# Patient Record
Sex: MALE | Race: WHITE
[De-identification: names, ages, dates, MRNs, and addresses within clinical notes are randomized per-mention and may not be internally consistent; named-entity substitution may affect disease eponyms.]

---

## 2021-01-25 ENCOUNTER — HOSPITAL ENCOUNTER (EMERGENCY)
Dept: HOSPITAL 54 - ER | Age: 51
Discharge: HOME | End: 2021-01-25
Payer: SELF-PAY

## 2021-01-25 VITALS — SYSTOLIC BLOOD PRESSURE: 147 MMHG | DIASTOLIC BLOOD PRESSURE: 90 MMHG

## 2021-01-25 VITALS — HEIGHT: 69 IN | BODY MASS INDEX: 34.07 KG/M2 | WEIGHT: 230 LBS

## 2021-01-25 DIAGNOSIS — U07.1: Primary | ICD-10-CM

## 2021-01-25 PROCEDURE — 71045 X-RAY EXAM CHEST 1 VIEW: CPT

## 2021-01-25 PROCEDURE — 99284 EMERGENCY DEPT VISIT MOD MDM: CPT

## 2021-01-25 PROCEDURE — U0003 INFECTIOUS AGENT DETECTION BY NUCLEIC ACID (DNA OR RNA); SEVERE ACUTE RESPIRATORY SYNDROME CORONAVIRUS 2 (SARS-COV-2) (CORONAVIRUS DISEASE [COVID-19]), AMPLIFIED PROBE TECHNIQUE, MAKING USE OF HIGH THROUGHPUT TECHNOLOGIES AS DESCRIBED BY CMS-2020-01-R: HCPCS

## 2021-01-25 PROCEDURE — C9803 HOPD COVID-19 SPEC COLLECT: HCPCS

## 2021-01-25 PROCEDURE — 87426 SARSCOV CORONAVIRUS AG IA: CPT

## 2021-01-25 NOTE — NUR
pt bibself c/o fever, chills, non productive cough, and night sweats x 3days. 
Pt [id number] breathing evenly and unlabored.  Pt skin is warm, dry, and intact. Pt 
attached to monitor and pox. Call light within reach

## 2022-05-08 ENCOUNTER — HOSPITAL ENCOUNTER (EMERGENCY)
Dept: HOSPITAL 54 - ER | Age: 52
Discharge: HOME | End: 2022-05-08
Payer: MEDICAID

## 2022-05-08 VITALS — DIASTOLIC BLOOD PRESSURE: 87 MMHG | SYSTOLIC BLOOD PRESSURE: 151 MMHG

## 2022-05-08 VITALS — BODY MASS INDEX: 34.8 KG/M2 | HEIGHT: 69 IN | WEIGHT: 235 LBS

## 2022-05-08 DIAGNOSIS — Z53.29: ICD-10-CM

## 2022-05-08 DIAGNOSIS — I31.9: Primary | ICD-10-CM

## 2022-05-08 DIAGNOSIS — K76.0: ICD-10-CM

## 2022-05-08 DIAGNOSIS — R19.7: ICD-10-CM

## 2022-05-08 DIAGNOSIS — Z20.822: ICD-10-CM

## 2022-05-08 DIAGNOSIS — R10.9: ICD-10-CM

## 2022-05-08 DIAGNOSIS — I47.1: ICD-10-CM

## 2022-05-08 DIAGNOSIS — R42: ICD-10-CM

## 2022-05-08 LAB
ALBUMIN SERPL BCP-MCNC: 3.7 G/DL (ref 3.4–5)
ALP SERPL-CCNC: 112 U/L (ref 46–116)
ALT SERPL W P-5'-P-CCNC: 83 U/L (ref 12–78)
AST SERPL W P-5'-P-CCNC: 33 U/L (ref 15–37)
BASOPHILS # BLD AUTO: 0 K/UL (ref 0–0.2)
BASOPHILS NFR BLD AUTO: 0.1 % (ref 0–2)
BILIRUB DIRECT SERPL-MCNC: 0.2 MG/DL (ref 0–0.2)
BILIRUB SERPL-MCNC: 0.7 MG/DL (ref 0.2–1)
BUN SERPL-MCNC: 21 MG/DL (ref 7–18)
CALCIUM SERPL-MCNC: 8.6 MG/DL (ref 8.5–10.1)
CHLORIDE SERPL-SCNC: 101 MMOL/L (ref 98–107)
CO2 SERPL-SCNC: 23 MMOL/L (ref 21–32)
CREAT SERPL-MCNC: 1.2 MG/DL (ref 0.6–1.3)
EOSINOPHIL NFR BLD AUTO: 0.7 % (ref 0–6)
GLUCOSE SERPL-MCNC: 122 MG/DL (ref 74–106)
HCT VFR BLD AUTO: 56 % (ref 39–51)
HGB BLD-MCNC: 18.5 G/DL (ref 13.5–17.5)
LIPASE SERPL-CCNC: 95 U/L (ref 73–393)
LYMPHOCYTES NFR BLD AUTO: 1.6 K/UL (ref 0.8–4.8)
LYMPHOCYTES NFR BLD AUTO: 13.3 % (ref 20–44)
LYMPHOCYTES NFR BLD MANUAL: 14 % (ref 16–48)
MCHC RBC AUTO-ENTMCNC: 33 G/DL (ref 31–36)
MCV RBC AUTO: 91 FL (ref 80–96)
MONOCYTES NFR BLD AUTO: 0.7 K/UL (ref 0.1–1.3)
MONOCYTES NFR BLD AUTO: 6 % (ref 2–12)
MONOCYTES NFR BLD MANUAL: 12 % (ref 0–11)
NEUTROPHILS # BLD AUTO: 9.3 K/UL (ref 1.8–8.9)
NEUTROPHILS NFR BLD AUTO: 79.9 % (ref 43–81)
NEUTS SEG NFR BLD MANUAL: 74 % (ref 42–76)
PLATELET # BLD AUTO: 198 K/UL (ref 150–450)
POTASSIUM SERPL-SCNC: 4 MMOL/L (ref 3.5–5.1)
PROT SERPL-MCNC: 7.9 G/DL (ref 6.4–8.2)
RBC # BLD AUTO: 6.15 MIL/UL (ref 4.5–6)
SODIUM SERPL-SCNC: 134 MMOL/L (ref 136–145)
WBC NRBC COR # BLD AUTO: 11.6 K/UL (ref 4.3–11)

## 2022-05-08 PROCEDURE — 80048 BASIC METABOLIC PNL TOTAL CA: CPT

## 2022-05-08 PROCEDURE — 80076 HEPATIC FUNCTION PANEL: CPT

## 2022-05-08 PROCEDURE — 96374 THER/PROPH/DIAG INJ IV PUSH: CPT

## 2022-05-08 PROCEDURE — 99285 EMERGENCY DEPT VISIT HI MDM: CPT

## 2022-05-08 PROCEDURE — 85007 BL SMEAR W/DIFF WBC COUNT: CPT

## 2022-05-08 PROCEDURE — 76705 ECHO EXAM OF ABDOMEN: CPT

## 2022-05-08 PROCEDURE — 87426 SARSCOV CORONAVIRUS AG IA: CPT

## 2022-05-08 PROCEDURE — 36415 COLL VENOUS BLD VENIPUNCTURE: CPT

## 2022-05-08 PROCEDURE — 85025 COMPLETE CBC W/AUTO DIFF WBC: CPT

## 2022-05-08 PROCEDURE — 74176 CT ABD & PELVIS W/O CONTRAST: CPT

## 2022-05-08 PROCEDURE — 84484 ASSAY OF TROPONIN QUANT: CPT

## 2022-05-08 PROCEDURE — 96361 HYDRATE IV INFUSION ADD-ON: CPT

## 2022-05-08 PROCEDURE — 93005 ELECTROCARDIOGRAM TRACING: CPT

## 2022-05-08 PROCEDURE — 71045 X-RAY EXAM CHEST 1 VIEW: CPT

## 2022-05-08 PROCEDURE — 70450 CT HEAD/BRAIN W/O DYE: CPT

## 2022-05-08 PROCEDURE — C9803 HOPD COVID-19 SPEC COLLECT: HCPCS

## 2022-05-08 PROCEDURE — 83690 ASSAY OF LIPASE: CPT

## 2022-05-08 NOTE — NUR
IV removed. Catheter intact and site benign. Pressure and 4x4 applied to site. 
No bleeding noted.Patient does not wish to proceed with medical care 
recommended by Dr. Cornelius. Patient given information related to possible 
complications, up to and including death, which could occur as a result of 
leaving the hospital at this time. Patient verbalizes understanding of risks 
involved due to leaving against medical advice. Patient has signed AMA form.

## 2022-05-08 NOTE — NUR
BIBS C/O GEN WEAKNESS, DIZZINESS, EPIGASTRIC PAIN, DIARRHEA AND HEADACHE 
x2DAYS. FEVER 2DAYS AGO. THE PATIENT RATES PAINS 8/10. IN ROOM AIR AND DENIES 
SOB. RESPIRATION REGULAR AND UNLABORED. THE PATIENT IS ATTACHED TO THE MONITOR. 
WILL CONTINUE TO MONITOR THE PATIENT.